# Patient Record
Sex: FEMALE | Race: WHITE | NOT HISPANIC OR LATINO | Employment: FULL TIME | URBAN - METROPOLITAN AREA
[De-identification: names, ages, dates, MRNs, and addresses within clinical notes are randomized per-mention and may not be internally consistent; named-entity substitution may affect disease eponyms.]

---

## 2017-08-28 ENCOUNTER — OFFICE VISIT (OUTPATIENT)
Dept: OBSTETRICS AND GYNECOLOGY | Age: 47
End: 2017-08-28

## 2017-08-28 VITALS
WEIGHT: 144 LBS | DIASTOLIC BLOOD PRESSURE: 60 MMHG | HEIGHT: 66 IN | BODY MASS INDEX: 23.14 KG/M2 | SYSTOLIC BLOOD PRESSURE: 102 MMHG

## 2017-08-28 DIAGNOSIS — Z01.419 WELL WOMAN EXAM WITH ROUTINE GYNECOLOGICAL EXAM: Primary | ICD-10-CM

## 2017-08-28 PROCEDURE — 99396 PREV VISIT EST AGE 40-64: CPT | Performed by: PHYSICIAN ASSISTANT

## 2017-08-28 NOTE — PROGRESS NOTES
"Subjective     Chief Complaint   Patient presents with   • Gynecologic Exam     annual exam.       History of Present Illness    Joana Henry is a 46 y.o.  who presents for annual exam.     She is doing well overall. Has had a hysterectomy approximately 4 years ago for pelvic pain and has been doing better since then. She is  and has 3 children. 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 Patient has had a hysterectomy.         Current contraception: status post hysterectomy  History of abnormal Pap smear: no  Received Gardasil immunization: no  Perform regular self breast exam: yes - occl  Family history of uterine or ovarian cancer: no  Family History of colon cancer: no  Family history of breast cancer: no    Mammogram: done today.  Colonoscopy: not indicated.  DEXA: not indicated.    Exercise: moderately active  Calcium/Vitamin D: adequate intake and uses supplements    The following portions of the patient's history were reviewed and updated as appropriate: allergies, current medications, past family history, past medical history, past social history, past surgical history and problem list.    Review of Systems   All other systems reviewed and are negative.      Review of Systems   Constitutional: Negative for fatigue.   Respiratory: Negative for shortness of breath.    Gastrointestinal: Negative for abdominal pain.   Genitourinary: Negative for dysuria.   Neurological: Negative for headaches.   Psychiatric/Behavioral: Negative for dysphoric mood.         Objective   Physical Exam    /60  Ht 66\" (167.6 cm)  Wt 144 lb (65.3 kg)  Breastfeeding? No  BMI 23.24 kg/m2    General:   alert, appears stated age and cooperative   Neck: no adenopathy and thyroid " normal to palpation   Heart: regular rate and rhythm   Lungs: clear to auscultation bilaterally   Abdomen: soft and nontender   Breast: inspection negative, no nipple discharge or bleeding, no masses or nodularity palpable   Vulva: normal   Vagina: normal mucosa, normal discharge   Cervix: absent   Uterus: absent   Adnexa: normal adnexa   Rectal: not indicated     Assessment/Plan   Joana was seen today for gynecologic exam.    Diagnoses and all orders for this visit:    Well woman exam with routine gynecological exam        All questions answered.  Breast self exam technique reviewed and patient encouraged to perform self-exam monthly.  Discussed healthy lifestyle modifications.  Recommended 30 minutes of aerobic exercise five times per week.  Discussed calcium needs to prevent osteoporosis.    Disc pap guidelines, no pap needed today  Disc genetic testing, she will consider, I gave her info.  Discussed 's risk as well since he has family h/o breast and pancreatic cancer

## 2017-11-28 ENCOUNTER — OFFICE VISIT (OUTPATIENT)
Dept: FAMILY MEDICINE CLINIC | Facility: CLINIC | Age: 47
End: 2017-11-28

## 2017-11-28 VITALS
DIASTOLIC BLOOD PRESSURE: 68 MMHG | HEART RATE: 85 BPM | WEIGHT: 143 LBS | BODY MASS INDEX: 22.98 KG/M2 | OXYGEN SATURATION: 99 % | SYSTOLIC BLOOD PRESSURE: 118 MMHG | HEIGHT: 66 IN

## 2017-11-28 DIAGNOSIS — E78.5 HYPERLIPIDEMIA, UNSPECIFIED HYPERLIPIDEMIA TYPE: ICD-10-CM

## 2017-11-28 DIAGNOSIS — E55.9 VITAMIN D DEFICIENCY: ICD-10-CM

## 2017-11-28 DIAGNOSIS — E53.8 B12 DEFICIENCY: ICD-10-CM

## 2017-11-28 DIAGNOSIS — Z00.00 ANNUAL PHYSICAL EXAM: Primary | ICD-10-CM

## 2017-11-28 DIAGNOSIS — J30.2 CHRONIC SEASONAL ALLERGIC RHINITIS, UNSPECIFIED TRIGGER: ICD-10-CM

## 2017-11-28 DIAGNOSIS — Z13.1 SCREENING FOR DIABETES MELLITUS: ICD-10-CM

## 2017-11-28 LAB
25(OH)D3+25(OH)D2 SERPL-MCNC: 41.7 NG/ML (ref 30–100)
BUN SERPL-MCNC: 18 MG/DL (ref 6–20)
BUN/CREAT SERPL: 27.3 (ref 7–25)
CALCIUM SERPL-MCNC: 9.8 MG/DL (ref 8.6–10.5)
CHLORIDE SERPL-SCNC: 102 MMOL/L (ref 98–107)
CHOLEST SERPL-MCNC: 253 MG/DL (ref 0–200)
CO2 SERPL-SCNC: 26.8 MMOL/L (ref 22–29)
CREAT SERPL-MCNC: 0.66 MG/DL (ref 0.57–1)
FOLATE SERPL-MCNC: >20 NG/ML (ref 4.78–24.2)
GFR SERPLBLD CREATININE-BSD FMLA CKD-EPI: 117 ML/MIN/1.73
GFR SERPLBLD CREATININE-BSD FMLA CKD-EPI: 96 ML/MIN/1.73
GLUCOSE SERPL-MCNC: 92 MG/DL (ref 65–99)
HDLC SERPL-MCNC: 81 MG/DL (ref 40–60)
LDLC SERPL CALC-MCNC: 143 MG/DL (ref 0–100)
LDLC/HDLC SERPL: 1.77 {RATIO}
POTASSIUM SERPL-SCNC: 4.9 MMOL/L (ref 3.5–5.2)
SODIUM SERPL-SCNC: 141 MMOL/L (ref 136–145)
TRIGL SERPL-MCNC: 144 MG/DL (ref 0–150)
VIT B12 SERPL-MCNC: 330 PG/ML (ref 211–946)
VLDLC SERPL CALC-MCNC: 28.8 MG/DL (ref 5–40)

## 2017-11-28 PROCEDURE — 99396 PREV VISIT EST AGE 40-64: CPT | Performed by: FAMILY MEDICINE

## 2017-11-28 RX ORDER — OMEGA-3S/DHA/EPA/FISH OIL/D3 300MG-1000
400 CAPSULE ORAL DAILY
COMMUNITY

## 2017-11-28 NOTE — PROGRESS NOTES
Subjective   Joana Henry is a 47 y.o. female. Patient is here today for   Chief Complaint   Patient presents with   • Establish Care   • Annual Exam            Joana Henry presents for an Annual Wellness Visit.  she has a history of   Patient Active Problem List   Diagnosis   • Hyperlipidemia   • Allergic   • Hearing loss in right ear   • Low serum vitamin B12   • Vitamin D deficiency   .      HPI    Health Habits:   Dental Exam. up to date, Dr. Clive Barragan  Eye Exam. up to date  Exercise: 4 times/week.  Current exercise activities include: walking and weightlifting   Eats a healthy diet, works as a Nutritionist  Tdap and Flu shots UTD  Follows with a Gynecologst, Dr. Fink annually  S/P Hysterectomy    The following portions of the patient's history were reviewed and updated as appropriate: allergies, current medications, past family history, past medical history, past social history, past surgical history and problem list.    Past Medical History:   Diagnosis Date   • Allergic     seasonal   • Atypical squamous cells of undetermined significance (ASCUS) on Papanicolaou smear of cervix 1999   • Hearing loss in right ear     chronic since childhood   • History of colposcopy with cervical biopsy 1999    neg   • Hyperlipidemia    • Low serum vitamin B12    • Vitamin D deficiency       Past Surgical History:   Procedure Laterality Date   • ADENOIDECTOMY     • BUNIONECTOMY      Left foot x 2   •  SECTION  2003    Angelic 7lbs 1 oz   •  SECTION  2000    Karen 7lbs 9.6oz   •  SECTION  1998    Female 7lbs 11oz   • CRYOTHERAPY     • ENDOMETRIAL ABLATION W/ NOVASURE  10/15/2010   • HYSTERECTOMY  2014    [facility]   • TONSILLECTOMY AND ADENOIDECTOMY      as a child   • TUBAL ABDOMINAL LIGATION  2003     Family History   Problem Relation Age of Onset   • Hypertension Father    • Breast cancer Maternal Aunt 40   • Hypertension Paternal Grandfather    • Heart attack  Paternal Grandfather    • Diabetes Paternal Grandfather    • Hypertension Mother    • Anxiety disorder Sister    • Anxiety disorder Brother    • No Known Problems Daughter    • No Known Problems Son    • Hyperlipidemia Maternal Grandmother    • No Known Problems Paternal Grandmother    • No Known Problems Paternal Aunt    • BRCA 1/2 Neg Hx    • Colon cancer Neg Hx    • Endometrial cancer Neg Hx          No Known Allergies     Social History       Social History Main Topics   • Smoking status: Never Smoker   • Smokeless tobacco: Never Used   • Alcohol use Yes      Comment: about 10 drinks per week   • Drug use: No   • Sexual activity: Yes     Partners: Male      Comment:        Social History Narrative    Lives at home with her , 2 of her 3 children, and 1 dog.  Works as a nutritionist.          Current Outpatient Prescriptions:   •  cholecalciferol (VITAMIN D3) 400 units tablet, Take 400 Units by mouth Daily., Disp: , Rfl:   •  Multiple Vitamin (MULTI VITAMIN DAILY PO), Take  by mouth., Disp: , Rfl:   •  Probiotic Product (PROBIOTIC ADVANCED PO), Take  by mouth., Disp: , Rfl:   •  loratadine-pseudoephedrine (CLARITIN-D 12 HOUR) 5-120 MG per 12 hr tablet, Take 1 tablet by mouth 2 (Two) Times a Day., Disp: 15 tablet, Rfl: 3       Review of Systems   Constitutional: Positive for fatigue. Negative for appetite change, fever and unexpected weight change.   HENT: Positive for hearing loss (R sided chronic, not interfering with daily activities, pt declines ENT referral at this time,). Negative for ear discharge, ear pain, rhinorrhea and sore throat.    Eyes: Negative for pain and visual disturbance.   Respiratory: Negative for cough, chest tightness, shortness of breath and wheezing.    Cardiovascular: Negative for chest pain and palpitations.   Gastrointestinal: Negative for abdominal pain, constipation, diarrhea, nausea and vomiting.   Genitourinary: Negative for dysuria, hematuria and vaginal bleeding.    Musculoskeletal: Negative for arthralgias and myalgias.   Skin: Negative for pallor and rash.   Allergic/Immunologic: Positive for environmental allergies (fall and spring). Negative for food allergies.   Neurological: Negative for dizziness and headaches.   Hematological: Negative for adenopathy.   Psychiatric/Behavioral: Negative for dysphoric mood and sleep disturbance. The patient is not nervous/anxious.          Objective       Vitals:    11/28/17 0808   BP: 118/68   Pulse: 85   SpO2: 99%             Physical Exam   Constitutional: Vital signs are normal. She appears well-developed and well-nourished. No distress.   BMI 23   HENT:   Head: Normocephalic and atraumatic.   Nose: Nose normal.   Mouth/Throat: Oropharynx is clear and moist.   Bilateral tympanosclerosis   Eyes: Conjunctivae are normal. Pupils are equal, round, and reactive to light.   Neck: No thyromegaly present.   Cardiovascular: Normal rate, regular rhythm, S1 normal, S2 normal and normal heart sounds.  Exam reveals no gallop and no friction rub.    No murmur heard.  Pulses:       Radial pulses are 2+ on the right side, and 2+ on the left side.   Pulmonary/Chest: Effort normal and breath sounds normal. She has no wheezes. She has no rales.   Abdominal: Soft. Bowel sounds are normal. She exhibits no distension. There is no hepatosplenomegaly. There is no tenderness. There is no rebound and no guarding.   Musculoskeletal: She exhibits no edema or deformity.   Lymphadenopathy:     She has no cervical adenopathy.        Right: No supraclavicular adenopathy present.        Left: No supraclavicular adenopathy present.   Neurological: She is alert. She has normal strength. Gait normal.   Skin: Skin is warm and dry. No cyanosis. Nails show no clubbing.   Psychiatric: She has a normal mood and affect. Her speech is normal and behavior is normal.   Nursing note and vitals reviewed.      ASSESSMENT/PLAN       Problem List Items Addressed This Visit         Cardiovascular and Mediastinum    Hyperlipidemia    Relevant Orders    Lipid Panel With LDL / HDL Ratio  Pt advised to increase exercise frequency to 30 minutes 5 days per week       Digestive    Vitamin D deficiency    Relevant Orders    Vitamin D 25 Hydroxy  Continue supplement      Other Visit Diagnoses     Annual physical exam    -  Primary    Relevant Orders    Lipid Panel With LDL / HDL Ratio    Basic metabolic panel    Vitamin B12 & Folate    Vitamin D 25 Hydroxy      B12 deficiency        Relevant Orders    Vitamin B12 & Folate      Screening for diabetes mellitus        Relevant Orders    Basic metabolic panel      Chronic seasonal allergic rhinitis, unspecified trigger        Relevant Medications    Refill loratadine-pseudoephedrine (CLARITIN-D 12 HOUR) 5-120 MG per 12 hr tablet   Pt advised not to use this for more than 7 consecutive days due to risk for rebound congestion (switch to plain Claritin and an OTC steroid nasal spray for maintenance if needed)            Patient Instructions   Allergic Rhinitis  Allergic rhinitis is when the mucous membranes in the nose respond to allergens. Allergens are particles in the air that cause your body to have an allergic reaction. This causes you to release allergic antibodies. Through a chain of events, these eventually cause you to release histamine into the blood stream. Although meant to protect the body, it is this release of histamine that causes your discomfort, such as frequent sneezing, congestion, and an itchy, runny nose.   CAUSES  Seasonal allergic rhinitis (hay fever) is caused by pollen allergens that may come from grasses, trees, and weeds. Year-round allergic rhinitis (perennial allergic rhinitis) is caused by allergens such as house dust mites, pet dander, and mold spores.  SYMPTOMS  · Nasal stuffiness (congestion).  · Itchy, runny nose with sneezing and tearing of the eyes.  DIAGNOSIS  Your health care provider can help you determine the allergen  or allergens that trigger your symptoms. If you and your health care provider are unable to determine the allergen, skin or blood testing may be used. Your health care provider will diagnose your condition after taking your health history and performing a physical exam. Your health care provider may assess you for other related conditions, such as asthma, pink eye, or an ear infection.  TREATMENT  Allergic rhinitis does not have a cure, but it can be controlled by:  · Medicines that block allergy symptoms. These may include allergy shots, nasal sprays, and oral antihistamines.  · Avoiding the allergen.  Hay fever may often be treated with antihistamines in pill or nasal spray forms. Antihistamines block the effects of histamine. There are over-the-counter medicines that may help with nasal congestion and swelling around the eyes. Check with your health care provider before taking or giving this medicine.  If avoiding the allergen or the medicine prescribed do not work, there are many new medicines your health care provider can prescribe. Stronger medicine may be used if initial measures are ineffective. Desensitizing injections can be used if medicine and avoidance does not work. Desensitization is when a patient is given ongoing shots until the body becomes less sensitive to the allergen. Make sure you follow up with your health care provider if problems continue.  HOME CARE INSTRUCTIONS  It is not possible to completely avoid allergens, but you can reduce your symptoms by taking steps to limit your exposure to them. It helps to know exactly what you are allergic to so that you can avoid your specific triggers.  SEEK MEDICAL CARE IF:  · You have a fever.  · You develop a cough that does not stop easily (persistent).  · You have shortness of breath.  · You start wheezing.  · Symptoms interfere with normal daily activities.     This information is not intended to replace advice given to you by your health care  provider. Make sure you discuss any questions you have with your health care provider.     Document Released: 09/12/2002 Document Revised: 01/08/2016 Document Reviewed: 08/25/2014  [employer] Interactive Patient Education ©2017 [employer]        Return in about 1 year (around 11/28/2018) for Annual.

## 2017-11-28 NOTE — PATIENT INSTRUCTIONS
Allergic Rhinitis  Allergic rhinitis is when the mucous membranes in the nose respond to allergens. Allergens are particles in the air that cause your body to have an allergic reaction. This causes you to release allergic antibodies. Through a chain of events, these eventually cause you to release histamine into the blood stream. Although meant to protect the body, it is this release of histamine that causes your discomfort, such as frequent sneezing, congestion, and an itchy, runny nose.   CAUSES  Seasonal allergic rhinitis (hay fever) is caused by pollen allergens that may come from grasses, trees, and weeds. Year-round allergic rhinitis (perennial allergic rhinitis) is caused by allergens such as house dust mites, pet dander, and mold spores.  SYMPTOMS  · Nasal stuffiness (congestion).  · Itchy, runny nose with sneezing and tearing of the eyes.  DIAGNOSIS  Your health care provider can help you determine the allergen or allergens that trigger your symptoms. If you and your health care provider are unable to determine the allergen, skin or blood testing may be used. Your health care provider will diagnose your condition after taking your health history and performing a physical exam. Your health care provider may assess you for other related conditions, such as asthma, pink eye, or an ear infection.  TREATMENT  Allergic rhinitis does not have a cure, but it can be controlled by:  · Medicines that block allergy symptoms. These may include allergy shots, nasal sprays, and oral antihistamines.  · Avoiding the allergen.  Hay fever may often be treated with antihistamines in pill or nasal spray forms. Antihistamines block the effects of histamine. There are over-the-counter medicines that may help with nasal congestion and swelling around the eyes. Check with your health care provider before taking or giving this medicine.  If avoiding the allergen or the medicine prescribed do not work, there are many new medicines  your health care provider can prescribe. Stronger medicine may be used if initial measures are ineffective. Desensitizing injections can be used if medicine and avoidance does not work. Desensitization is when a patient is given ongoing shots until the body becomes less sensitive to the allergen. Make sure you follow up with your health care provider if problems continue.  HOME CARE INSTRUCTIONS  It is not possible to completely avoid allergens, but you can reduce your symptoms by taking steps to limit your exposure to them. It helps to know exactly what you are allergic to so that you can avoid your specific triggers.  SEEK MEDICAL CARE IF:  · You have a fever.  · You develop a cough that does not stop easily (persistent).  · You have shortness of breath.  · You start wheezing.  · Symptoms interfere with normal daily activities.     This information is not intended to replace advice given to you by your health care provider. Make sure you discuss any questions you have with your health care provider.     Document Released: 09/12/2002 Document Revised: 01/08/2016 Document Reviewed: 08/25/2014  [employer] Interactive Patient Education ©2017 [employer]

## 2018-09-13 ENCOUNTER — APPOINTMENT (OUTPATIENT)
Dept: WOMENS IMAGING | Facility: HOSPITAL | Age: 48
End: 2018-09-13

## 2018-09-13 ENCOUNTER — OFFICE VISIT (OUTPATIENT)
Dept: OBSTETRICS AND GYNECOLOGY | Age: 48
End: 2018-09-13

## 2018-09-13 VITALS
SYSTOLIC BLOOD PRESSURE: 112 MMHG | DIASTOLIC BLOOD PRESSURE: 62 MMHG | HEIGHT: 67 IN | BODY MASS INDEX: 22.76 KG/M2 | WEIGHT: 145 LBS

## 2018-09-13 DIAGNOSIS — Z01.419 WELL WOMAN EXAM WITH ROUTINE GYNECOLOGICAL EXAM: Primary | ICD-10-CM

## 2018-09-13 DIAGNOSIS — Z80.3 FAMILY HISTORY OF BREAST CANCER: ICD-10-CM

## 2018-09-13 PROCEDURE — 99396 PREV VISIT EST AGE 40-64: CPT | Performed by: PHYSICIAN ASSISTANT

## 2018-09-13 PROCEDURE — 77067 SCR MAMMO BI INCL CAD: CPT | Performed by: RADIOLOGY

## 2018-09-13 NOTE — PROGRESS NOTES
"Subjective     Chief Complaint   Patient presents with   • Gynecologic Exam     annual exam. pap no longer needed, m/g today       History of Present Illness    Joana Henry is a 47 y.o.  who presents for annual exam.    Pt here for her annual  Doing well but thinks she has a change to her right breast  She had mammogram today but also notes right breast \"change\". Present for the past 2 wks  She has 2 kids at [facility].  Second one just started there.    She has a Sophomore at [employer]  She does have a family h/o of a maternal aunt with breast cancer at 36 yoa  We have discussed genetic testing multiple times and she hasn't done it but wants to proceed today  She has a h/o hyst d/t painful periods and failed ablation  She has not h/o abnormal paps  She is a pt of Dr Fink      Her menses are rare, lasting 0-3 days, dysmenorrhea none   Obstetric History:  OB History      Para Term  AB Living    3 3 3          SAB TAB Ectopic Molar Multiple Live Births                        Menstrual History:     No LMP recorded. Patient has had a hysterectomy.         Current contraception: status post hysterectomy  History of abnormal Pap smear: no  Received Gardasil immunization: no  Perform regular self breast exam: yes - mthly  Family history of uterine or ovarian cancer: no  Family History of colon cancer: no  Family history of breast cancer: yes - maternal aunt at 36 yoa    Mammogram: done today.  Colonoscopy: not indicated.  DEXA: not indicated.    Exercise: not active  Calcium/Vitamin D: adequate intake    The following portions of the patient's history were reviewed and updated as appropriate: allergies, current medications, past family history, past medical history, past social history, past surgical history and problem list.    Review of Systems    Review of Systems   Constitutional: Negative for fatigue.   Respiratory: Negative for shortness of breath.    Gastrointestinal: Negative for abdominal pain. " "  Genitourinary: Negative for dysuria.   Neurological: Negative for headaches.   Psychiatric/Behavioral: Negative for dysphoric mood.         Objective   Physical Exam    /62   Ht 170.2 cm (67\")   Wt 65.8 kg (145 lb)   Breastfeeding? No   BMI 22.71 kg/m²     General:   alert, appears stated age and cooperative   Neck: no adenopathy and thyroid normal to palpation   Heart: regular rate and rhythm   Lungs: clear to auscultation bilaterally   Abdomen: soft and nontender   Breast: inspection negative, no nipple discharge or bleeding, no masses or nodularity palpable and no masses noted. right breast with \"less dense tissue\" noted at the 2 o'clock position, no skin changes or nipple d/c noted   Vulva: normal   Vagina: normal mucosa, normal discharge   Cervix: absent   Uterus: absent   Adnexa: normal adnexa and no mass, fullness, tenderness   Rectal: not indicated     Assessment/Plan   Joana was seen today for gynecologic exam.    Diagnoses and all orders for this visit:    Well woman exam with routine gynecological exam    Family history of breast cancer  -     [facility] Hereditary Cancer Screen        All questions answered.  Breast self exam technique reviewed and patient encouraged to perform self-exam monthly.  Discussed healthy lifestyle modifications.  Recommended 30 minutes of aerobic exercise five times per week.  Discussed calcium needs to prevent osteoporosis.      Disc pap guidelines, no longer needed  Disc genetic testing, will do today. Disc madeline reyes result as well  Await mammogram result and plan f/u as indicated.  She will c/w BSE and let me know if anything changes.           "

## 2018-09-25 ENCOUNTER — TELEPHONE (OUTPATIENT)
Dept: OBSTETRICS AND GYNECOLOGY | Age: 48
End: 2018-09-25

## 2021-03-09 ENCOUNTER — OFFICE VISIT (OUTPATIENT)
Dept: OBSTETRICS AND GYNECOLOGY | Age: 51
End: 2021-03-09

## 2021-03-09 VITALS
HEIGHT: 66 IN | DIASTOLIC BLOOD PRESSURE: 64 MMHG | BODY MASS INDEX: 24.59 KG/M2 | WEIGHT: 153 LBS | SYSTOLIC BLOOD PRESSURE: 112 MMHG

## 2021-03-09 DIAGNOSIS — Z01.419 WELL WOMAN EXAM WITH ROUTINE GYNECOLOGICAL EXAM: Primary | ICD-10-CM

## 2021-03-09 PROCEDURE — 99396 PREV VISIT EST AGE 40-64: CPT | Performed by: PHYSICIAN ASSISTANT

## 2021-03-09 NOTE — PROGRESS NOTES
"Subjective     Chief Complaint   Patient presents with   • Gynecologic Exam     annual exam paps no longer needed, last /2018       History of Present Illness    Joana Henry is a 50 y.o.  who presents for annual exam.    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 Patient has had a hysterectomy.         Current contraception: status post hysterectomy  History of abnormal Pap smear: no  Received Gardasil immunization: no  Perform regular self breast exam: yes - mthly  Family history of uterine or ovarian cancer: no  Family History of colon cancer: no  Family history of breast cancer: yes - sister at 45    Mammogram: ordered.  Colonoscopy: recommended.  DEXA: not indicated.    Exercise: moderately active  Calcium/Vitamin D: adequate intake    The following portions of the patient's history were reviewed and updated as appropriate: allergies, current medications, past family history, past medical history, past social history, past surgical history and problem list.    Review of Systems   All other systems reviewed and are negative.      Review of Systems   Constitutional: Negative for fatigue.   Respiratory: Negative for shortness of breath.    Gastrointestinal: Negative for abdominal pain.   Genitourinary: Negative for dysuria.   Neurological: Negative for headaches.   Psychiatric/Behavioral: Negative for dysphoric mood.         Objective   Physical Exam    /64   Ht 167.6 cm (66\")   Wt " 69.4 kg (153 lb)   Breastfeeding No   BMI 24.69 kg/m²   General:   alert, comfortable and no distress   Heart: regular rate and rhythm   Lungs: clear to auscultation bilaterally   Breast: normal appearance, no masses or tenderness, Inspection negative, No nipple retraction or dimpling, No nipple discharge or bleeding, No axillary or supraclavicular adenopathy, Normal to palpation without dominant masses   Neck: no adenopathy and no carotid bruit   Abdomen: {normal findings: soft, non-tender   CVA: Not performed today   Pelvis: External genitalia: normal general appearance  Vaginal: normal mucosa without prolapse or lesions  Cervix: absent  Adnexa: normal bimanual exam  Uterus: absent   Extremities: Not performed today   Neurologic: negative   Psychiatric: Normal affect, judgement, and mood     Assessment/Plan   Diagnoses and all orders for this visit:    1. Well woman exam with routine gynecological exam (Primary)  -     Ambulatory Referral to Family Practice  -     CBC & Differential  -     Comprehensive Metabolic Panel  -     TSH  -     Vitamin D 25 Hydroxy  -     Lipid Panel  -     Ambulatory Referral to Gastroenterology        All questions answered.  Breast self exam technique reviewed and patient encouraged to perform self-exam monthly.  Discussed healthy lifestyle modifications.  Recommended 30 minutes of aerobic exercise five times per week.  Discussed calcium needs to prevent osteoporosis.    No longer needs paps  HM labs ordered  referral to GI and PCP sent

## 2021-03-10 ENCOUNTER — TELEPHONE (OUTPATIENT)
Dept: OBSTETRICS AND GYNECOLOGY | Age: 51
End: 2021-03-10

## 2021-03-10 ENCOUNTER — TELEPHONE (OUTPATIENT)
Dept: GASTROENTEROLOGY | Facility: CLINIC | Age: 51
End: 2021-03-10

## 2021-03-10 LAB
25(OH)D3+25(OH)D2 SERPL-MCNC: 48.9 NG/ML (ref 30–100)
ALBUMIN SERPL-MCNC: 4.5 G/DL (ref 3.8–4.8)
ALBUMIN/GLOB SERPL: 2.4 {RATIO} (ref 1.2–2.2)
ALP SERPL-CCNC: 56 IU/L (ref 39–117)
ALT SERPL-CCNC: 27 IU/L (ref 0–32)
AST SERPL-CCNC: 21 IU/L (ref 0–40)
BASOPHILS # BLD AUTO: 0 X10E3/UL (ref 0–0.2)
BASOPHILS NFR BLD AUTO: 0 %
BILIRUB SERPL-MCNC: 0.2 MG/DL (ref 0–1.2)
BUN SERPL-MCNC: 12 MG/DL (ref 6–24)
BUN/CREAT SERPL: 18 (ref 9–23)
CALCIUM SERPL-MCNC: 9.7 MG/DL (ref 8.7–10.2)
CHLORIDE SERPL-SCNC: 104 MMOL/L (ref 96–106)
CHOLEST SERPL-MCNC: 243 MG/DL (ref 100–199)
CO2 SERPL-SCNC: 23 MMOL/L (ref 20–29)
CREAT SERPL-MCNC: 0.67 MG/DL (ref 0.57–1)
EOSINOPHIL # BLD AUTO: 0.2 X10E3/UL (ref 0–0.4)
EOSINOPHIL NFR BLD AUTO: 3 %
ERYTHROCYTE [DISTWIDTH] IN BLOOD BY AUTOMATED COUNT: 12.3 % (ref 11.7–15.4)
GLOBULIN SER CALC-MCNC: 1.9 G/DL (ref 1.5–4.5)
GLUCOSE SERPL-MCNC: 96 MG/DL (ref 65–99)
HCT VFR BLD AUTO: 40.1 % (ref 34–46.6)
HDLC SERPL-MCNC: 57 MG/DL
HGB BLD-MCNC: 13.9 G/DL (ref 11.1–15.9)
IMM GRANULOCYTES # BLD AUTO: 0 X10E3/UL (ref 0–0.1)
IMM GRANULOCYTES NFR BLD AUTO: 0 %
LDLC SERPL CALC-MCNC: 143 MG/DL (ref 0–99)
LYMPHOCYTES # BLD AUTO: 1 X10E3/UL (ref 0.7–3.1)
LYMPHOCYTES NFR BLD AUTO: 15 %
MCH RBC QN AUTO: 31.6 PG (ref 26.6–33)
MCHC RBC AUTO-ENTMCNC: 34.7 G/DL (ref 31.5–35.7)
MCV RBC AUTO: 91 FL (ref 79–97)
MONOCYTES # BLD AUTO: 0.5 X10E3/UL (ref 0.1–0.9)
MONOCYTES NFR BLD AUTO: 7 %
NEUTROPHILS # BLD AUTO: 5 X10E3/UL (ref 1.4–7)
NEUTROPHILS NFR BLD AUTO: 75 %
PLATELET # BLD AUTO: 338 X10E3/UL (ref 150–450)
POTASSIUM SERPL-SCNC: 4 MMOL/L (ref 3.5–5.2)
PROT SERPL-MCNC: 6.4 G/DL (ref 6–8.5)
RBC # BLD AUTO: 4.4 X10E6/UL (ref 3.77–5.28)
SODIUM SERPL-SCNC: 142 MMOL/L (ref 134–144)
TRIGL SERPL-MCNC: 241 MG/DL (ref 0–149)
TSH SERPL DL<=0.005 MIU/L-ACNC: 1.69 UIU/ML (ref 0.45–4.5)
VLDLC SERPL CALC-MCNC: 43 MG/DL (ref 5–40)
WBC # BLD AUTO: 6.8 X10E3/UL (ref 3.4–10.8)

## 2021-03-10 NOTE — TELEPHONE ENCOUNTER
----- Message from MICHELLE Kuo sent at 3/10/2021  9:46 AM EST -----  Let her know her labs look good. She has elevations to her cholesterol, those appear stable from previous years but I do recommend f/u with PCP

## 2021-03-16 ENCOUNTER — PROCEDURE VISIT (OUTPATIENT)
Dept: OBSTETRICS AND GYNECOLOGY | Age: 51
End: 2021-03-16

## 2021-03-16 ENCOUNTER — APPOINTMENT (OUTPATIENT)
Dept: WOMENS IMAGING | Facility: HOSPITAL | Age: 51
End: 2021-03-16

## 2021-03-16 DIAGNOSIS — Z12.31 VISIT FOR SCREENING MAMMOGRAM: Primary | ICD-10-CM

## 2021-03-16 PROCEDURE — 77067 SCR MAMMO BI INCL CAD: CPT | Performed by: OBSTETRICS & GYNECOLOGY

## 2021-03-16 PROCEDURE — 77067 SCR MAMMO BI INCL CAD: CPT | Performed by: RADIOLOGY

## 2021-04-06 ENCOUNTER — BULK ORDERING (OUTPATIENT)
Dept: CASE MANAGEMENT | Facility: OTHER | Age: 51
End: 2021-04-06

## 2021-04-06 DIAGNOSIS — Z23 IMMUNIZATION DUE: ICD-10-CM

## 2022-03-18 ENCOUNTER — PROCEDURE VISIT (OUTPATIENT)
Dept: OBSTETRICS AND GYNECOLOGY | Age: 52
End: 2022-03-18

## 2022-03-18 ENCOUNTER — APPOINTMENT (OUTPATIENT)
Dept: WOMENS IMAGING | Facility: HOSPITAL | Age: 52
End: 2022-03-18

## 2022-03-18 DIAGNOSIS — Z12.31 VISIT FOR SCREENING MAMMOGRAM: Primary | ICD-10-CM

## 2022-03-18 PROCEDURE — 77063 BREAST TOMOSYNTHESIS BI: CPT | Performed by: PHYSICIAN ASSISTANT

## 2022-03-18 PROCEDURE — 77063 BREAST TOMOSYNTHESIS BI: CPT | Performed by: RADIOLOGY

## 2022-03-18 PROCEDURE — 77067 SCR MAMMO BI INCL CAD: CPT | Performed by: PHYSICIAN ASSISTANT

## 2022-03-18 PROCEDURE — 77067 SCR MAMMO BI INCL CAD: CPT | Performed by: RADIOLOGY

## 2022-03-22 DIAGNOSIS — R92.8 ABNORMAL MAMMOGRAM: Primary | ICD-10-CM

## 2022-03-29 ENCOUNTER — APPOINTMENT (OUTPATIENT)
Dept: WOMENS IMAGING | Facility: HOSPITAL | Age: 52
End: 2022-03-29

## 2022-03-29 PROCEDURE — 77065 DX MAMMO INCL CAD UNI: CPT | Performed by: RADIOLOGY

## 2022-03-29 PROCEDURE — 76642 ULTRASOUND BREAST LIMITED: CPT | Performed by: RADIOLOGY

## 2022-03-29 PROCEDURE — 77061 BREAST TOMOSYNTHESIS UNI: CPT | Performed by: RADIOLOGY

## 2022-03-29 PROCEDURE — G0279 TOMOSYNTHESIS, MAMMO: HCPCS | Performed by: RADIOLOGY

## 2022-03-30 DIAGNOSIS — R92.8 ABNORMAL MAMMOGRAM: Primary | ICD-10-CM

## 2022-05-09 ENCOUNTER — OFFICE VISIT (OUTPATIENT)
Dept: OBSTETRICS AND GYNECOLOGY | Age: 52
End: 2022-05-09

## 2022-05-09 VITALS
DIASTOLIC BLOOD PRESSURE: 70 MMHG | WEIGHT: 148 LBS | HEIGHT: 66 IN | SYSTOLIC BLOOD PRESSURE: 118 MMHG | BODY MASS INDEX: 23.78 KG/M2

## 2022-05-09 DIAGNOSIS — Z01.419 ENCOUNTER FOR GYNECOLOGICAL EXAMINATION WITHOUT ABNORMAL FINDING: Primary | ICD-10-CM

## 2022-05-09 PROCEDURE — 99396 PREV VISIT EST AGE 40-64: CPT | Performed by: OBSTETRICS & GYNECOLOGY

## 2022-05-09 RX ORDER — SACCHAROMYCES BOULARDII 250 MG
250 CAPSULE ORAL
COMMUNITY
End: 2022-05-09 | Stop reason: SDUPTHER

## 2022-05-09 NOTE — PROGRESS NOTES
Routine Annual Visit    2022    Patient: Joana Henry          MR#:[id number]      Chief Complaint   Patient presents with   • Gynecologic Exam     Last Pap Hx Hyst around , Last Mammo 3/18/22, Last C/Scope 22, No concerns at this time       History of Present Illness    51 y.o. female  who presents for annual exam.   Feels well, hyst- pap not indicated  Some mild HF and NS  CSC   Mammogram UTD- will have fu US in 6 months  ? Anal skin tag vs hemorrhoid- exam is normal, likely small hemorrhoid  Works as dietician for [employer]  Walks dog, hand weights and rowing machine for exercise          No LMP recorded (exact date). Patient has had a hysterectomy.  Obstetric History:  OB History        3    Para   3    Term   3            AB        Living   3       SAB        IAB        Ectopic        Molar        Multiple        Live Births   3               Menstrual History:     No LMP recorded (exact date). Patient has had a hysterectomy.       Sexual History:       ________________________________________  Patient Active Problem List   Diagnosis   • Hyperlipidemia   • Allergic   • Hearing loss in right ear   • Low serum vitamin B12   • Vitamin D deficiency       Past Medical History:   Diagnosis Date   • Allergic     seasonal   • Atypical squamous cells of undetermined significance (ASCUS) on Papanicolaou smear of cervix 1999   • Hearing loss in right ear     chronic since childhood   • History of colposcopy with cervical biopsy 1999    neg   • Hyperlipidemia    • Low serum vitamin B12    • Vitamin D deficiency        Past Surgical History:   Procedure Laterality Date   • ADENOIDECTOMY     • BUNIONECTOMY      Left foot x 2   •  SECTION  2003    Angelic 7lbs 1 oz   •  SECTION  2000    Karen 7lbs 9.6oz   •  SECTION  1998    Female 7lbs 11oz   • CRYOTHERAPY     • ENDOMETRIAL ABLATION W/ JULIANA  10/15/2010   • HYSTERECTOMY   "01/20/2014    [facility]   • TONSILLECTOMY AND ADENOIDECTOMY      as a child   • TUBAL ABDOMINAL LIGATION  08/25/2003       Social History     Tobacco Use   Smoking Status Never Smoker   Smokeless Tobacco Never Used       has a current medication list which includes the following prescription(s): cholecalciferol, dha-epa-vitamin e, loratadine-pseudoephedrine, multiple vitamin, and probiotic product.  ________________________________________    Current contraception: status post hysterectomy  History of abnormal Pap smear: no  Family history of Breast cancer: maternal aunt  Family history of uterine or ovarian cancer: no  Family History of colon cancer/colon polyps: yes - one polyp  History of abnormal mammogram: no      The following portions of the patient's history were reviewed and updated as appropriate: allergies, current medications, past family history, past medical history, past social history, past surgical history and problem list.    Review of Systems    Pertinent items are noted in HPI.     Objective   Physical Exam    /70   Ht 167.6 cm (66\")   Wt 67.1 kg (148 lb)   LMP  (Exact Date)   Breastfeeding No   BMI 23.89 kg/m²    BP Readings from Last 3 Encounters:   05/09/22 118/70   03/09/21 112/64   09/13/18 112/62      Wt Readings from Last 3 Encounters:   05/09/22 67.1 kg (148 lb)   03/09/21 69.4 kg (153 lb)   09/13/18 65.8 kg (145 lb)      BMI: Estimated body mass index is 23.89 kg/m² as calculated from the following:    Height as of this encounter: 167.6 cm (66\").    Weight as of this encounter: 67.1 kg (148 lb).      General:   alert, appears stated age and cooperative   Abdomen: soft, non-tender, without masses or organomegaly   Breast: inspection negative, no nipple discharge or bleeding, no masses or nodularity palpable   Vulva: normal   Vagina: normal mucosa   Cervix: absent   Uterus: absent    Adnexa: no mass, fullness, tenderness   Normal external rectal exam, small hemorrhoids "     Assessment:    1. Normal annual exam   Assessment     ICD-10-CM ICD-9-CM   1. Encounter for gynecological examination without abnormal finding  Z01.419 V72.31     Plan:    Plan     []  Mammogram request made  []  PAP done  []  Labs:   []  GC/Chl/TV  []  DEXA scan   []  Referral for colonoscopy:       Diagnoses and all orders for this visit:    1. Encounter for gynecological examination without abnormal finding (Primary)            Counseling:  --Nutrition: Stressed importance of moderation and caloric balance, stressed fresh fruit and vegetables  --Exercise: Stressed the importance of regular exercise. 3-5 times weekly   - Discussed screening mammogram recommendations.   --Discussed benefits of screening colonoscopy- age 45 unless FH  --Discussed pap smear screening recommendations

## 2022-10-03 ENCOUNTER — APPOINTMENT (OUTPATIENT)
Dept: WOMENS IMAGING | Facility: HOSPITAL | Age: 52
End: 2022-10-03

## 2022-10-03 ENCOUNTER — OFFICE VISIT (OUTPATIENT)
Dept: SLEEP MEDICINE | Facility: HOSPITAL | Age: 52
End: 2022-10-03

## 2022-10-03 VITALS
HEART RATE: 81 BPM | OXYGEN SATURATION: 99 % | HEIGHT: 66 IN | WEIGHT: 147.8 LBS | DIASTOLIC BLOOD PRESSURE: 74 MMHG | SYSTOLIC BLOOD PRESSURE: 125 MMHG | BODY MASS INDEX: 23.75 KG/M2

## 2022-10-03 DIAGNOSIS — R06.83 SNORING: ICD-10-CM

## 2022-10-03 DIAGNOSIS — G47.10 HYPERSOMNIA: Primary | ICD-10-CM

## 2022-10-03 DIAGNOSIS — R06.81 WITNESSED EPISODE OF APNEA: ICD-10-CM

## 2022-10-03 PROCEDURE — 76642 ULTRASOUND BREAST LIMITED: CPT | Performed by: RADIOLOGY

## 2022-10-03 PROCEDURE — G0463 HOSPITAL OUTPT CLINIC VISIT: HCPCS

## 2022-10-03 NOTE — PROGRESS NOTES
[facility]  Telephone: [phone] / Fax: [phone] [place]  Telephone: [phone] / Fax: [phone] Natalya Hamm    Referring Physician: Provider, No Known  PCP: Provider, No Known    Reason for consult:  sleep apnea    Joana Henry is a 51 y.o.female  was seen in the Sleep Disorders Center today for evaluation of sleep apnea. She reports loud snoring, gasping for breath, recurrent arousals from sore throat. It has been going on for over 5 yrs. It is worse with alcohol intake. Not better if on her side or stomach. She had adenoid removed as a kid. Tonsils are still in. No hx of DNS or nasal injury.  She goes to bed at 10pm-and up at 6:30am. She wakes up feeling tired in the morning and continues to feel tired/sleepy despite increasing sleep time on weekends. She reports occasional RLS only. No parasomnias.  She is a bit restless during sleep.    SH- 2-3 glasses of wine nightly or 1-2 beers nightly. 1 cup of coffee every 3 days    ROS-+nasal congestion, +cough, rest is negative    Joana Henry  has a past medical history of Allergic, Atypical squamous cells of undetermined significance (ASCUS) on Papanicolaou smear of cervix (11/02/1999), Hearing loss in right ear, History of colposcopy with cervical biopsy (12/17/1999), Hyperlipidemia, Low serum vitamin B12, and Vitamin D deficiency.    Current Medications:    Current Outpatient Medications:   •  cholecalciferol (VITAMIN D3) 400 units tablet, Take 400 Units by mouth Daily., Disp: , Rfl:   •  DHA-EPA-Vitamin E (OMEGA-3 COMPLEX PO), Take  by mouth., Disp: , Rfl:   •  loratadine-pseudoephedrine (CLARITIN-D 12 HOUR) 5-120 MG per 12 hr tablet, Take 1 tablet by mouth 2 (Two) Times a Day., Disp: 15 tablet, Rfl: 3  •  Multiple Vitamin (MULTI VITAMIN DAILY PO), Take  by mouth., Disp: , Rfl:   •  Probiotic Product (PROBIOTIC ADVANCED PO), Take  by mouth., Disp: , Rfl:     I have reviewed Past Medical History, Past Surgical History, Medication  "List, Social History and Family History as entered in Sleep Questionnaire and EPIC.    ESS  9   Vital Signs /74   Pulse 81   Ht 167.6 cm (66\")   Wt 67 kg (147 lb 12.8 oz)   SpO2 99%   BMI 23.86 kg/m²  Body mass index is 23.86 kg/m².    General Alert and oriented. No acute distress noted   Pharynx/Throat Class IV  Mallampati airway, large tongue, no evidence of redundant lateral pharyngeal tissue. No oral lesions. No thrush. Moist mucous membranes.   Head Normocephalic. Symmetrical. Atraumatic.    Nose No septal deviation. No drainage   Chest Wall Normal shape. Symmetric expansion with respiration. No tenderness.   Neck Trachea midline, no thyromegaly or adenopathy    Lungs Clear to auscultation bilaterally. No wheezes. No rhonchi. No rales. Respirations regular, even and unlabored.   Heart Regular rhythm and normal rate. Normal S1 and S2. No murmur   Abdomen Soft, non-tender and non-distended. Normal bowel sounds. No masses.   Extremities Moves all extremities well. No edema   Psychiatric Normal mood and affect.        Impression:  1. Hypersomnia    2. Snoring    3. Witnessed episode of apnea          Plan:  I discussed the pathophysiology of obstructive sleep apnea with the patient.  We discussed the adverse outcomes associated with untreated sleep-disordered breathing.  We discussed treatment modalities of obstructive sleep apnea including CPAP device. Sleep study will be scheduled to establish a definitive diagnosis of sleep disorder breathing.  Weight loss will be strongly beneficial in order to reduce the severity of sleep-disordered breathing.  Patient has narrow oropharyngeal structure.  Caution during activities that require prolonged concentration is strongly advised.  After sleep study results are available, patient will be notified, and appointment will be scheduled to discuss sleep study results and treatment recommendations.    HST was scheduled    I appreciate the opportunity to participate " in this patient's care.      JEFFY Martinez  [facility]  Phone: [phone]      Part of this note may be an electronic transcription/translation of spoken language to printed text using the Dragon Dictation System. Some errors may exist even though the document was edited.

## 2022-10-05 DIAGNOSIS — Z12.31 VISIT FOR SCREENING MAMMOGRAM: Primary | ICD-10-CM

## 2022-10-05 DIAGNOSIS — N63.20 MASS OF LEFT BREAST, UNSPECIFIED QUADRANT: ICD-10-CM

## 2022-11-14 ENCOUNTER — HOSPITAL ENCOUNTER (OUTPATIENT)
Dept: SLEEP MEDICINE | Facility: HOSPITAL | Age: 52
Discharge: HOME | End: 2022-11-14
Admitting: NURSE PRACTITIONER

## 2022-11-14 DIAGNOSIS — G47.10 HYPERSOMNIA: ICD-10-CM

## 2022-11-14 DIAGNOSIS — R06.81 WITNESSED EPISODE OF APNEA: ICD-10-CM

## 2022-11-14 DIAGNOSIS — R06.83 SNORING: ICD-10-CM

## 2022-11-14 PROCEDURE — 95806 SLEEP STUDY UNATT&RESP EFFT: CPT

## 2022-12-05 ENCOUNTER — TELEPHONE (OUTPATIENT)
Dept: SLEEP MEDICINE | Facility: HOSPITAL | Age: 52
End: 2022-12-05

## 2022-12-05 NOTE — TELEPHONE ENCOUNTER
Spoke with patient about sleep study results, she had reviewed them on mychart, sending orders to [facility], will schedule compliance follow up once set up on CPAP

## 2023-02-08 ENCOUNTER — OFFICE VISIT (OUTPATIENT)
Dept: SLEEP MEDICINE | Facility: HOSPITAL | Age: 53
End: 2023-02-08
Payer: COMMERCIAL

## 2023-02-08 VITALS
SYSTOLIC BLOOD PRESSURE: 103 MMHG | HEIGHT: 66 IN | WEIGHT: 145 LBS | OXYGEN SATURATION: 96 % | BODY MASS INDEX: 23.3 KG/M2 | HEART RATE: 85 BPM | DIASTOLIC BLOOD PRESSURE: 68 MMHG

## 2023-02-08 DIAGNOSIS — G47.34 NOCTURNAL HYPOXIA: ICD-10-CM

## 2023-02-08 DIAGNOSIS — G47.33 OSA (OBSTRUCTIVE SLEEP APNEA): Primary | ICD-10-CM

## 2023-02-08 PROCEDURE — G0463 HOSPITAL OUTPT CLINIC VISIT: HCPCS

## 2023-02-08 NOTE — PROGRESS NOTES
"[facility]  Telephone: [phone] / Fax: [phone] [place]  Telephone: [phone] / Fax: [phone] Natalya Hamm    PCP: Provider, No Known    Reason for visit: NUSRAT f/u    Joana Henry is a 52 y.o.female  was last seen at [facility] sleep lab in Oct 2022. She has severe NUSRAT with sleep related hypoxia. Pre treatment AHI was 49/hr with supine AHI 51/hr and lowest desaturation to 74%. She has adjusted to the CPAP and pressures well.  Snoring resolved. She feels more rested in the morning. Her sleep schedule is 11pm-7am. ESS is 3.    SH- no tobacco, 0-2 caffeine, 5 alc per week    ROS-negative.    DME [facility]    Current Medications:    Current Outpatient Medications:   •  cholecalciferol (VITAMIN D3) 400 units tablet, Take 400 Units by mouth Daily., Disp: , Rfl:   •  DHA-EPA-Vitamin E (OMEGA-3 COMPLEX PO), Take  by mouth., Disp: , Rfl:   •  loratadine-pseudoephedrine (CLARITIN-D 12 HOUR) 5-120 MG per 12 hr tablet, Take 1 tablet by mouth 2 (Two) Times a Day., Disp: 15 tablet, Rfl: 3  •  Multiple Vitamin (MULTI VITAMIN DAILY PO), Take  by mouth., Disp: , Rfl:   •  Probiotic Product (PROBIOTIC ADVANCED PO), Take  by mouth., Disp: , Rfl:    also entered in Sleep Questionnaire    Patient  has a past medical history of Allergic, Atypical squamous cells of undetermined significance (ASCUS) on Papanicolaou smear of cervix (11/02/1999), Hearing loss in right ear, History of colposcopy with cervical biopsy (12/17/1999), Hyperlipidemia, Low serum vitamin B12, and Vitamin D deficiency.    I have reviewed the Past Medical History, Past Surgical History, Social History and Family History.    Vital Signs /68   Pulse 85   Ht 167.6 cm (66\")   Wt 65.8 kg (145 lb)   SpO2 96%   BMI 23.40 kg/m²  Body mass index is 23.4 kg/m².    General Alert and oriented. No acute distress noted   Pharynx/Throat Class IV  Mallampati airway, large tongue, no evidence of redundant lateral pharyngeal tissue. No oral " lesions. No thrush. Moist mucous membranes.   Head Normocephalic. Symmetrical. Atraumatic.    Nose No septal deviation. No drainage   Chest Wall Normal shape. Symmetric expansion with respiration. No tenderness.   Neck Trachea midline, no thyromegaly or adenopathy    Lungs Clear to auscultation bilaterally. No wheezes. No rhonchi. No rales. Respirations regular, even and unlabored.   Heart Regular rhythm and normal rate. Normal S1 and S2. No murmur   Abdomen Soft, non-tender and non-distended. Normal bowel sounds. No masses.   Extremities Moves all extremities well. No edema   Psychiatric Normal mood and affect.     Testing:  Download 12/12/23-2/6/23 excellent use with average nightly use of 7 hours and 42 minutes on auto CPAP 5-15cm H2O, avg pr is 9.5cm H2O, AHI 3.0/hr.    Study-Diagnostic findings: The patient tolerated the home sleep testing with monitoring time of 453 minutes. The data obtained make this a technically adequate study. The apnea hypopneas index(AHI) was 49.5 per sleep hour.  The AHI during supine position was 51.6 per sleep hour. Mean heart rate of 90.7 BPM.  Snoring was noted 3.2% of sleep time. Lowest oxygen saturation during the study was 74%. Saturation below 89% was noted for 50.3 mins.     Impression:  1. NUSRAT (obstructive sleep apnea)    2. Nocturnal hypoxia          Plan:  Joana is doing great on auto CPAP 5-15cm H2O. She finds the machine pressures and mask comfortable. She benefits from its use in terms of reduction of hypersomnia and snoring.  AHI appears to be within adequate range. I reviewed download report and original sleep study report with the patient. Her original HST showed significant sleep related hypoxia. I ordered overnight oximetry on CPAP RA to make sure all her desaturations are fully corrected with the CPAP device.      Follow up with Dr. Loco in 6 months    Thank you for allowing me to participate in your patient's care.      JEFFY Martinez  [facility]  Phone: [phone]      Part of this note may be an electronic transcription/translation of spoken language to printed text using the Dragon Dictation System.

## 2023-04-04 ENCOUNTER — APPOINTMENT (OUTPATIENT)
Dept: WOMENS IMAGING | Facility: HOSPITAL | Age: 53
End: 2023-04-04
Payer: COMMERCIAL

## 2023-04-04 PROCEDURE — 77066 DX MAMMO INCL CAD BI: CPT | Performed by: RADIOLOGY

## 2023-04-04 PROCEDURE — 76642 ULTRASOUND BREAST LIMITED: CPT | Performed by: RADIOLOGY

## 2023-04-04 PROCEDURE — G0279 TOMOSYNTHESIS, MAMMO: HCPCS | Performed by: RADIOLOGY

## 2023-04-04 PROCEDURE — 77062 BREAST TOMOSYNTHESIS BI: CPT | Performed by: RADIOLOGY

## 2023-04-07 DIAGNOSIS — R92.8 ABNORMAL MAMMOGRAM: Primary | ICD-10-CM

## 2023-05-15 ENCOUNTER — OFFICE VISIT (OUTPATIENT)
Dept: OBSTETRICS AND GYNECOLOGY | Age: 53
End: 2023-05-15
Payer: COMMERCIAL

## 2023-05-15 VITALS
SYSTOLIC BLOOD PRESSURE: 128 MMHG | HEIGHT: 66 IN | WEIGHT: 145.8 LBS | BODY MASS INDEX: 23.43 KG/M2 | DIASTOLIC BLOOD PRESSURE: 76 MMHG

## 2023-05-15 DIAGNOSIS — Z01.419 ENCOUNTER FOR GYNECOLOGICAL EXAMINATION WITHOUT ABNORMAL FINDING: Primary | ICD-10-CM

## 2023-05-15 PROCEDURE — 99396 PREV VISIT EST AGE 40-64: CPT | Performed by: OBSTETRICS & GYNECOLOGY

## 2023-05-15 NOTE — PROGRESS NOTES
Routine Annual Visit    5/15/2023    Patient: Joana Henry          MR#:[id number]      Chief Complaint   Patient presents with   • Gynecologic Exam     Annual Exam - last pap unknown, HYST, mammo 10/5/22, colonoscopy 2022, Pt states her sister was just diagnosed with breast cancer & she would like to discuss if there is anything she should do differently going forward       History of Present Illness    52 y.o. female  who presents for annual exam.     23- mammogram done  Sister 43 just diagnosed with early stage breast cancer  Caroline model done , pt is 16.6%  UTD mammo   Reviewed chemoprevention- evista and tamoxifen    No HF or NS    Going to [place] this summer      No LMP recorded. Patient has had a hysterectomy.  Obstetric History:  OB History        3    Para   3    Term   3            AB        Living   3       SAB        IAB        Ectopic        Molar        Multiple        Live Births   3               Menstrual History:     No LMP recorded. Patient has had a hysterectomy.       Sexual History:       ________________________________________  Patient Active Problem List   Diagnosis   • Hyperlipidemia   • Allergic   • Hearing loss in right ear   • Low serum vitamin B12   • Vitamin D deficiency       Past Medical History:   Diagnosis Date   • Allergic     seasonal   • Atypical squamous cells of undetermined significance (ASCUS) on Papanicolaou smear of cervix 1999   • Hearing loss in right ear     chronic since childhood   • History of colposcopy with cervical biopsy 1999    neg   • Hyperlipidemia    • Low serum vitamin B12    • Vitamin D deficiency        Past Surgical History:   Procedure Laterality Date   • ADENOIDECTOMY     • BUNIONECTOMY      Left foot x 2   •  SECTION  2003    Angelic 7lbs 1 oz   •  SECTION  2000    Karen 7lbs 9.6oz   •  SECTION  1998    Female 7lbs 11oz   • CRYOTHERAPY     • ENDOMETRIAL ABLATION W/  "JULIANA  10/15/2010   • HYSTERECTOMY  [facility]/20/2014    [facility]   • TONSILLECTOMY AND ADENOIDECTOMY      as a child   • TUBAL ABDOMINAL LIGATION  08/25/2003       Social History     Tobacco Use   Smoking Status Never   Smokeless Tobacco Never       has a current medication list which includes the following prescription(s): cholecalciferol, multiple vitamin, dha-epa-vitamin e, and probiotic product.  ________________________________________    Current contraception: none  History of abnormal Pap smear: no  Family history of Breast cancer: sister age 43  Family history of uterine or ovarian cancer: no  Family History of colon cancer/colon polyps: no  History of abnormal mammogram: yes - follow up , no biopsies      The following portions of the patient's history were reviewed and updated as appropriate: allergies, current medications, past family history, past medical history, past social history, past surgical history and problem list.    Review of Systems    Pertinent items are noted in HPI.     Objective   Physical Exam    /76   Ht 167.6 cm (66\")   Wt 66.1 kg (145 lb 12.8 oz)   BMI 23.53 kg/m²    BP Readings from Last 3 Encounters:   05/15/23 128/76   02/08/23 103/68   10/03/22 125/74      Wt Readings from Last 3 Encounters:   05/15/23 66.1 kg (145 lb 12.8 oz)   02/08/23 65.8 kg (145 lb)   10/03/22 67 kg (147 lb 12.8 oz)      BMI: Estimated body mass index is 23.53 kg/m² as calculated from the following:    Height as of this encounter: 167.6 cm (66\").    Weight as of this encounter: 66.1 kg (145 lb 12.8 oz).      General:   alert, appears stated age and cooperative   Abdomen: soft, non-tender, without masses or organomegaly   Breast: inspection negative, no nipple discharge or bleeding, no masses or nodularity palpable   Vulva: normal   Vagina: normal mucosa   Cervix: absent   Uterus: absent   Adnexa: no mass, fullness, tenderness     Assessment:    1. Normal annual exam   Assessment     ICD-10-CM ICD-9-CM   1. " Encounter for gynecological examination without abnormal finding  Z01.419 V72.31     Plan:    Plan     []  Mammogram request made  []  PAP done  []  Labs:   []  GC/Chl/TV  []  DEXA scan   []  Referral for colonoscopy:       Diagnoses and all orders for this visit:    1. Encounter for gynecological examination without abnormal finding (Primary)            Counseling:  --Nutrition: Stressed importance of moderation and caloric balance, stressed fresh fruit and vegetables  --Exercise: Stressed the importance of regular exercise. 3-5 times weekly   - Discussed screening mammogram recommendations.   --Discussed benefits of screening colonoscopy- age 45 unless FH  --Discussed pap smear screening recommendations

## 2023-08-22 ENCOUNTER — OFFICE VISIT (OUTPATIENT)
Dept: SLEEP MEDICINE | Facility: HOSPITAL | Age: 53
End: 2023-08-22
Payer: COMMERCIAL

## 2023-08-22 VITALS
HEIGHT: 66 IN | HEART RATE: 93 BPM | SYSTOLIC BLOOD PRESSURE: 132 MMHG | OXYGEN SATURATION: 97 % | WEIGHT: 145 LBS | DIASTOLIC BLOOD PRESSURE: 79 MMHG | BODY MASS INDEX: 23.3 KG/M2

## 2023-08-22 DIAGNOSIS — R68.2 DRY MOUTH: ICD-10-CM

## 2023-08-22 DIAGNOSIS — G47.33 OBSTRUCTIVE SLEEP APNEA: Primary | ICD-10-CM

## 2023-08-22 PROCEDURE — G0463 HOSPITAL OUTPT CLINIC VISIT: HCPCS

## 2023-08-22 NOTE — PROGRESS NOTES
"[facility]  [address]  [place] [address] [phone]    PCP: Provider, No Known    Reason for visit:  Sleep disorders: NUSRAT    Joana is a 52 y.o.female who was seen in the Sleep Disorders Center today. Regular fu. She sleeps from 11pm to 6:30am. She uses nasal cushion, mild dry mouth, fits well per patient. She wakes up rested and refreshed. No EDS.  [place] Sleepiness Scale is 4. Caffeine 1 per day. Alcohol 5-6 per week.    Joana  reports that she has never smoked. She has never used smokeless tobacco.    Pertinent Positive Review of Systems of denies  Rest of Review of Systems was negative as recorded in Sleep Questionnaire.    Patient  has a past medical history of Allergic, Atypical squamous cells of undetermined significance (ASCUS) on Papanicolaou smear of cervix (11/02/1999), Hearing loss in right ear, History of colposcopy with cervical biopsy (12/17/1999), Hyperlipidemia, Low serum vitamin B12, and Vitamin D deficiency.     Current Medications:    Current Outpatient Medications:     cholecalciferol (VITAMIN D3) 400 units tablet, Take 1 tablet by mouth Daily., Disp: , Rfl:     DHA-EPA-Vitamin E (OMEGA-3 COMPLEX PO), Take  by mouth. (Patient not taking: Reported on 5/15/2023), Disp: , Rfl:     Multiple Vitamin (MULTI VITAMIN DAILY PO), Take  by mouth., Disp: , Rfl:     Probiotic Product (PROBIOTIC ADVANCED PO), Take  by mouth. (Patient not taking: Reported on 5/15/2023), Disp: , Rfl:    also entered in Sleep Questionnaire         Vital Signs: /79   Pulse 93   Ht 167.6 cm (65.98\")   Wt 65.8 kg (145 lb)   SpO2 97%   BMI 23.41 kg/mý     Body mass index is 23.41 kg/mý.       Tongue: Large       Dentition: good       Pharynx: Posterior pharyngeal pillars are narrow   Mallampatti: IV (only hard palate visible)        General: Alert. Cooperative. Well developed. No acute distress.             Head:  Normocephalic. Symmetrical. Atraumatic.              " Nose: No septal deviation. No drainage.          Throat: No oral lesions. No thrush. Moist mucous membranes.    Chest Wall:  Normal shape. Symmetric expansion with respiration. No tenderness.             Neck:  Trachea midline.           Lungs:  Clear to auscultation bilaterally. No wheezes. No rhonchi. No rales. Respirations regular, even and unlabored.            Heart:  Regular rhythm and normal rate. Normal S1 and S2. No murmur.     Abdomen:  Soft, non-tender and non-distended. Normal bowel sounds. No masses.  Extremities:  Moves all extremities well. No edema.    Psychiatric: Normal mood and affect.    Diagnostic data available to date is as below and was reviewed on current visit:  11/25/22: The patient tolerated the home sleep testing with monitoring time of 453 minutes. The data obtained make this a technically adequate study. The apnea hypopneas index(AHI) was 49.5 per sleep hour. The AHI during supine position was 51.6 per sleep hour. Mean heart rate of 90.7 BPM. Snoring was noted 3.2% of sleep time. Lowest oxygen saturation during the study was 74%. Saturation below 89% was noted for 50.3 mins.     No results found for: IRON, TIBC, FERRITIN    Most current available usage data reviewed on 08/22/2023:        [employer]    Prescription to [facility] [place] for replacement supplies as below:    nasal mask      Description Replacement    Nasal PILLOWS      A 7034 Nasal Pillows  every 3 mth    A 7033 Repl Nasal Pillows  2 per mth    Nasal MASK/CUSHION     x A 7034 Nasal Mask/Cushion  every 3 mth   x A 7032 Repl Nasal Mask/Cushion  2 per mth    Full Face MASK      A 7030 Full Face Mask  every 3 mth    A 7031 Repl Face Mask  1 per mth      A 4604 Heated Tubing  every 3 mth    A 7037 Standard Tubing  every 3 mth   x A 7035 Headgear  every 3 mth   x A 7046 Repl Humidifier Chamber  every 6 yrs   x A 7038 Disposable Filters  2 per mth   x A 7039 Non-disposable Filter  every 6 mth   x A 7036 Chin Strap  every 6 mth      Orders Placed This Encounter   Procedures    SCANNED - PULMONARY RESULTS          Impression:  1. Obstructive sleep apnea    2. Dry mouth        Plan:  Joana is compliant. She has air leaks but does not bother patient. Discussed ways to address but not essential, if remains compliant and benefits.    I reiterated the importance of effective treatment of obstructive sleep apnea with PAP machine.  Cardiovascular health risks of untreated sleep apnea were again reviewed.  Patient was asked to remain cautious if there is persistent hypersomnolence. The benefit of weight loss in reducing severity of obstructive sleep apnea was discussed.  Patient would benefit from adhering to a strict diet to achieve ideal BMI.     Change of PAP supplies regularly is important for effective use.  Change of cushion on the mask or plugs on nasal pillows along with disposable filters once every month and change of mask frame, tubing, headgear and Velcro straps every 6 months at the minimum was reiterated.    This patient is compliant with PAP machine and benefits from its use.  Apnea hypopneas index is corrected/improved.  Daytime hypersomnolence has resolved.     Patient will follow up in this clinic in 1 year aprn    Thank you for allowing me to participate in your patient's care.    Electronically signed by Garret Loco MD, 08/22/23, 3:35 PM EDT.    Part of this note may be an electronic transcription/translation of spoken language to printed text using the Dragon Dictation System.

## 2024-04-05 ENCOUNTER — APPOINTMENT (OUTPATIENT)
Dept: WOMENS IMAGING | Facility: HOSPITAL | Age: 54
End: 2024-04-05
Payer: COMMERCIAL

## 2024-04-05 PROCEDURE — 77066 DX MAMMO INCL CAD BI: CPT | Performed by: RADIOLOGY

## 2024-04-05 PROCEDURE — 76642 ULTRASOUND BREAST LIMITED: CPT | Performed by: RADIOLOGY

## 2024-04-05 PROCEDURE — G0279 TOMOSYNTHESIS, MAMMO: HCPCS | Performed by: RADIOLOGY

## 2024-04-05 PROCEDURE — 77062 BREAST TOMOSYNTHESIS BI: CPT | Performed by: RADIOLOGY

## 2024-04-09 DIAGNOSIS — N63.20 MASS OF LEFT BREAST, UNSPECIFIED QUADRANT: Primary | ICD-10-CM

## 2024-04-23 ENCOUNTER — LAB REQUISITION (OUTPATIENT)
Dept: LAB | Facility: HOSPITAL | Age: 54
End: 2024-04-23
Payer: COMMERCIAL

## 2024-04-23 ENCOUNTER — APPOINTMENT (OUTPATIENT)
Dept: WOMENS IMAGING | Facility: HOSPITAL | Age: 54
End: 2024-04-23
Payer: COMMERCIAL

## 2024-04-23 DIAGNOSIS — N63.0 UNSPECIFIED LUMP IN UNSPECIFIED BREAST: ICD-10-CM

## 2024-04-23 PROCEDURE — 19083 BX BREAST 1ST LESION US IMAG: CPT | Performed by: RADIOLOGY

## 2024-04-23 PROCEDURE — A4648 IMPLANTABLE TISSUE MARKER: HCPCS | Performed by: RADIOLOGY

## 2024-04-23 PROCEDURE — 88305 TISSUE EXAM BY PATHOLOGIST: CPT | Performed by: PHYSICIAN ASSISTANT

## 2024-04-25 DIAGNOSIS — Z12.31 SCREENING MAMMOGRAM FOR BREAST CANCER: Primary | ICD-10-CM

## 2024-04-25 LAB
LAB AP CASE REPORT: NORMAL
LAB AP DIAGNOSIS COMMENT: NORMAL
PATH REPORT.FINAL DX SPEC: NORMAL
PATH REPORT.GROSS SPEC: NORMAL

## 2024-05-20 ENCOUNTER — OFFICE VISIT (OUTPATIENT)
Dept: OBSTETRICS AND GYNECOLOGY | Age: 54
End: 2024-05-20
Payer: COMMERCIAL

## 2024-05-20 VITALS
WEIGHT: 141 LBS | SYSTOLIC BLOOD PRESSURE: 110 MMHG | HEIGHT: 66 IN | DIASTOLIC BLOOD PRESSURE: 72 MMHG | BODY MASS INDEX: 22.66 KG/M2

## 2024-05-20 DIAGNOSIS — Z01.419 ENCOUNTER FOR GYNECOLOGICAL EXAMINATION WITHOUT ABNORMAL FINDING: Primary | ICD-10-CM

## 2024-05-20 DIAGNOSIS — N95.1 MENOPAUSAL SYMPTOMS: ICD-10-CM

## 2024-05-20 PROCEDURE — 99396 PREV VISIT EST AGE 40-64: CPT | Performed by: OBSTETRICS & GYNECOLOGY

## 2024-05-20 NOTE — PROGRESS NOTES
Routine Annual Visit    2024    Patient: Joana Henry          MR#:[id number]      Chief Complaint   Patient presents with    Gynecologic Exam     Annual exam, Last Pap unknown (HYST), Last Mammogram 2024, Last Colonoscopy 2022, Pt has no complaints today, Doing well        History of Present Illness    53 y.o. female  who presents for annual exam.     Patient feels well  She is having some night sweats  She is wondering if she is menopausal  We discussed hormone replacement or more specifically estrogen replacement  Her daughters lives in [place] and in [place]  Pap is not indicated  Mammogram up-to-date  Colonoscopy up-to-date      No LMP recorded. Patient has had a hysterectomy.  Obstetric History:  OB History          3    Para   3    Term   3            AB        Living   3         SAB        IAB        Ectopic        Molar        Multiple        Live Births   3               Menstrual History:     No LMP recorded. Patient has had a hysterectomy.       Sexual History:       ________________________________________  Patient Active Problem List   Diagnosis    Hyperlipidemia    Allergic    Hearing loss in right ear    Low serum vitamin B12    Vitamin D deficiency       Past Medical History:   Diagnosis Date    Allergic     seasonal    Atypical squamous cells of undetermined significance (ASCUS) on Papanicolaou smear of cervix 1999    Hearing loss in right ear     chronic since childhood    History of colposcopy with cervical biopsy 1999    neg    Hyperlipidemia     Low serum vitamin B12     Vitamin D deficiency        Past Surgical History:   Procedure Laterality Date    ADENOIDECTOMY      BUNIONECTOMY      Left foot x 2     SECTION  2003    Angelic 7lbs 1 oz     SECTION  2000    Karen 7lbs 9.6oz     SECTION  1998    Female 7lbs 11oz    CRYOTHERAPY      ENDOMETRIAL ABLATION W/ NOVASURE  10/15/2010    HYSTERECTOMY   "01/20/2014    [facility]    TONSILLECTOMY AND ADENOIDECTOMY      as a child    TUBAL ABDOMINAL LIGATION  08/25/2003       Social History     Tobacco Use   Smoking Status Never   Smokeless Tobacco Never       has a current medication list which includes the following prescription(s): cholecalciferol, multiple vitamin, dha-epa-vitamin e, and probiotic product.  ________________________________________    Current contraception: status post hysterectomy  History of abnormal Pap smear: no  Family history of Breast cancer: sister and mat aunt  Family history of uterine or ovarian cancer: no  Family History of colon cancer/colon polyps: no  History of abnormal mammogram: no      The following portions of the patient's history were reviewed and updated as appropriate: allergies, current medications, past family history, past medical history, past social history, past surgical history, and problem list.    Review of Systems    Pertinent items are noted in HPI.     Objective   Physical Exam    /72   Ht 167.6 cm (65.98\")   Wt 64 kg (141 lb)   BMI 22.77 kg/m²    BP Readings from Last 3 Encounters:   05/20/24 110/72   08/22/23 132/79   05/15/23 128/76      Wt Readings from Last 3 Encounters:   05/20/24 64 kg (141 lb)   08/22/23 65.8 kg (145 lb)   05/15/23 66.1 kg (145 lb 12.8 oz)      BMI: Estimated body mass index is 22.77 kg/m² as calculated from the following:    Height as of this encounter: 167.6 cm (65.98\").    Weight as of this encounter: 64 kg (141 lb).      General:   alert, appears stated age, and cooperative   Abdomen: soft, non-tender, without masses or organomegaly   Breast: inspection negative, no nipple discharge or bleeding, no masses or nodularity palpable   Vulva: normal   Vagina: normal mucosa   Cervix: absent   Uterus: absent    Adnexa: no mass, fullness, tenderness     Assessment:    1. Normal annual exam   Assessment     ICD-10-CM ICD-9-CM   1. Encounter for gynecological examination without abnormal " finding  Z01.419 V72.31   2. Menopausal symptoms  N95.1 627.2     Plan:    Plan     []  Mammogram request made  []  PAP done  []  Labs:   []  GC/Chl/TV  []  DEXA scan   []  Referral for colonoscopy:       Diagnoses and all orders for this visit:    1. Encounter for gynecological examination without abnormal finding (Primary)    2. Menopausal symptoms  -     Follicle Stimulating Hormone            Counseling:  --Nutrition: Stressed importance of moderation and caloric balance, stressed fresh fruit and vegetables  --Exercise: Stressed the importance of regular exercise. 3-5 times weekly   - Discussed screening mammogram recommendations.   --Discussed benefits of screening colonoscopy- age 45 unless FH  --Discussed pap smear screening recommendations

## 2024-05-21 LAB — FSH SERPL-ACNC: 29.1 MIU/ML

## 2024-05-21 RX ORDER — ESTRADIOL 1 MG/1
1 TABLET ORAL DAILY
Qty: 90 TABLET | Refills: 3 | Status: SHIPPED | OUTPATIENT
Start: 2024-05-21

## 2025-01-17 ENCOUNTER — OFFICE VISIT (OUTPATIENT)
Dept: SLEEP MEDICINE | Facility: HOSPITAL | Age: 55
End: 2025-01-17
Payer: COMMERCIAL

## 2025-01-17 VITALS
WEIGHT: 144 LBS | HEART RATE: 82 BPM | HEIGHT: 66 IN | BODY MASS INDEX: 23.14 KG/M2 | SYSTOLIC BLOOD PRESSURE: 110 MMHG | DIASTOLIC BLOOD PRESSURE: 70 MMHG | OXYGEN SATURATION: 96 %

## 2025-01-17 DIAGNOSIS — G47.33 OBSTRUCTIVE SLEEP APNEA: Primary | ICD-10-CM

## 2025-01-17 PROCEDURE — G0463 HOSPITAL OUTPT CLINIC VISIT: HCPCS

## 2025-01-17 NOTE — PROGRESS NOTES
"[facility]  Telephone: [phone] / Fax: [phone] [place]  Telephone: [phone] / Fax: [phone] Natalya Hamm    PCP: Steph Prasad PA    Reason for visit: NUSRAT f/u    Joana Henry is a 54 y.o.female  was last seen at [facility] sleep lab in 2023 . She is here today to review response to therapy with CPAP. She is very compliant with CPAP, but is tired of wearing the machine in general. She does not like the marks on the face in the morning hours. No significant weight changes occurred since 2022. No upper airway surgeries either. She sleeps on her stomach or side. Device settings 5-15cm H2O appear comfortable in general. Leaks are not too excessive. We discussed inspire. She did some research about it and is not interested to proceed at this time.    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    ROS-  negative.    DME [facility]    Current Medications:    Current Outpatient Medications:     cholecalciferol (VITAMIN D3) 400 units tablet, Take 1 tablet by mouth Daily., Disp: , Rfl:     DHA-EPA-Vitamin E (OMEGA-3 COMPLEX PO), Take  by mouth. (Patient not taking: Reported on 5/15/2023), Disp: , Rfl:     estradiol (Estrace) 1 MG tablet, Take 1 tablet by mouth Daily., Disp: 90 tablet, Rfl: 3    Multiple Vitamin (MULTI VITAMIN DAILY PO), Take  by mouth., Disp: , Rfl:     Probiotic Product (PROBIOTIC ADVANCED PO), Take  by mouth. (Patient not taking: Reported on 5/15/2023), Disp: , Rfl:    also entered in Sleep Questionnaire    Patient  has a past medical history of Allergic, Atypical squamous cells of undetermined significance (ASCUS) on Papanicolaou smear of cervix (11/02/1999), Hearing loss in right ear, History of colposcopy with cervical biopsy (12/17/1999), Hyperlipidemia, Low serum vitamin B12, and Vitamin D deficiency.    I have reviewed the Past Medical History, Past Surgical History, Social History and Family History.    Vital Signs /70   Pulse 82   Ht 167.6 cm (65.98\")  "  Wt 65.3 kg (144 lb)   SpO2 96%   BMI 23.26 kg/m²  Body mass index is 23.26 kg/m².    General Alert and oriented. No acute distress noted   Pharynx/Throat Class  IV  Mallampati airway, large tongue, no evidence of redundant lateral pharyngeal tissue. No oral lesions. No thrush. Moist mucous membranes.   Head Normocephalic. Symmetrical. Atraumatic.    Nose No septal deviation. No drainage   Chest Wall Normal shape. Symmetric expansion with respiration. No tenderness.   Neck Trachea midline, no thyromegaly or adenopathy    Lungs Clear to auscultation bilaterally. No wheezes. No rhonchi. No rales. Respirations regular, even and unlabored.   Heart Regular rhythm and normal rate. Normal S1 and S2. No murmur   Abdomen Soft, non-tender and non-distended. Normal bowel sounds. No masses.   Extremities Moves all extremities well. No edema   Psychiatric Normal mood and affect.     Testing:  Download last 90 day usage, average nightly use of 7 hours and 33 minutes on auto CPAP 5-15cm H2O, avg pressure is 8.9cm H2O, AHI is 1.6/hr, leak is 15.3L/min    Study-11/25/22-Diagnostic data available to date is as below and was reviewed on current visit:  11/25/22: The patient tolerated the home sleep testing with monitoring time of 453 minutes. The data obtained make this a technically adequate study. The apnea hypopneas index(AHI) was 49.5 per sleep hour. The AHI during supine position was 51.6 per sleep hour. Mean heart rate of 90.7 BPM. Snoring was noted 3.2% of sleep time. Lowest oxygen saturation during the study was 74%. Saturation below 89% was noted for 50.3 mins.        Impression:  1. Obstructive sleep apnea          Plan:  Try CPAP liners or GECKO padding to help reduce skin marks from CPAP tubing. She overall benefits from treatment in terms of elimination of snoring and apneas.  AHI appears to be within adequate range. I reviewed download report and original sleep study report with the patient. I advised pt to contact us  if PAP pressures feel excessive or insufficient.    Weight loss will be strongly beneficial to reduce the severity of sleep-disordered breathing.       Follow up with Dr. Loco in one year    Thank you for allowing me to participate in your patient's care.      JEFFY Martinez  [facility]  Phone: [phone]      Part of this note may be an electronic transcription/translation of spoken language to printed text using the Dragon Dictation System.

## 2025-04-07 ENCOUNTER — APPOINTMENT (OUTPATIENT)
Dept: WOMENS IMAGING | Facility: HOSPITAL | Age: 55
End: 2025-04-07
Payer: COMMERCIAL

## 2025-04-07 PROCEDURE — 77063 BREAST TOMOSYNTHESIS BI: CPT | Performed by: RADIOLOGY

## 2025-04-07 PROCEDURE — 77067 SCR MAMMO BI INCL CAD: CPT | Performed by: RADIOLOGY

## 2025-04-08 ENCOUNTER — RESULTS FOLLOW-UP (OUTPATIENT)
Dept: OBSTETRICS AND GYNECOLOGY | Age: 55
End: 2025-04-08
Payer: COMMERCIAL

## 2025-04-08 DIAGNOSIS — N64.89 BREAST ASYMMETRY: Primary | ICD-10-CM

## 2025-04-28 ENCOUNTER — APPOINTMENT (OUTPATIENT)
Dept: WOMENS IMAGING | Facility: HOSPITAL | Age: 55
End: 2025-04-28
Payer: COMMERCIAL

## 2025-04-28 PROCEDURE — 76642 ULTRASOUND BREAST LIMITED: CPT | Performed by: RADIOLOGY

## 2025-04-28 PROCEDURE — G0279 TOMOSYNTHESIS, MAMMO: HCPCS | Performed by: RADIOLOGY

## 2025-04-28 PROCEDURE — 77061 BREAST TOMOSYNTHESIS UNI: CPT | Performed by: RADIOLOGY

## 2025-04-28 PROCEDURE — 77065 DX MAMMO INCL CAD UNI: CPT | Performed by: RADIOLOGY

## 2025-04-29 DIAGNOSIS — N60.02 BREAST CYST, LEFT: Primary | ICD-10-CM

## 2025-05-12 ENCOUNTER — LAB REQUISITION (OUTPATIENT)
Dept: LAB | Facility: HOSPITAL | Age: 55
End: 2025-05-12
Payer: COMMERCIAL

## 2025-05-12 DIAGNOSIS — N63.0 UNSPECIFIED LUMP IN UNSPECIFIED BREAST: ICD-10-CM

## 2025-05-12 PROCEDURE — 88112 CYTOPATH CELL ENHANCE TECH: CPT | Performed by: PHYSICIAN ASSISTANT

## 2025-05-15 LAB
DX PRELIMINARY: NORMAL
LAB AP CASE REPORT: NORMAL
LAB AP CLINICAL INFORMATION: NORMAL
PATH REPORT.FINAL DX SPEC: NORMAL
PATH REPORT.GROSS SPEC: NORMAL